# Patient Record
Sex: FEMALE | Race: WHITE | NOT HISPANIC OR LATINO | Employment: STUDENT | URBAN - METROPOLITAN AREA
[De-identification: names, ages, dates, MRNs, and addresses within clinical notes are randomized per-mention and may not be internally consistent; named-entity substitution may affect disease eponyms.]

---

## 2017-08-18 DIAGNOSIS — R00.0 SINUS TACHYCARDIA: Primary | ICD-10-CM

## 2017-08-22 ENCOUNTER — HOSPITAL ENCOUNTER (OUTPATIENT)
Dept: CARDIOLOGY | Facility: CLINIC | Age: 22
Discharge: HOME | End: 2017-08-22
Payer: COMMERCIAL

## 2017-08-22 ENCOUNTER — PATIENT MESSAGE (OUTPATIENT)
Dept: ELECTROPHYSIOLOGY | Facility: CLINIC | Age: 22
End: 2017-08-22

## 2017-08-22 ENCOUNTER — OFFICE VISIT (OUTPATIENT)
Dept: ELECTROPHYSIOLOGY | Facility: CLINIC | Age: 22
End: 2017-08-22
Payer: COMMERCIAL

## 2017-08-22 VITALS
SYSTOLIC BLOOD PRESSURE: 125 MMHG | BODY MASS INDEX: 31.43 KG/M2 | HEART RATE: 88 BPM | HEIGHT: 66 IN | DIASTOLIC BLOOD PRESSURE: 71 MMHG | WEIGHT: 195.56 LBS

## 2017-08-22 DIAGNOSIS — R00.0 SINUS TACHYCARDIA: ICD-10-CM

## 2017-08-22 DIAGNOSIS — F90.2 ATTENTION DEFICIT HYPERACTIVITY DISORDER (ADHD), COMBINED TYPE: ICD-10-CM

## 2017-08-22 PROBLEM — F90.9 ADHD: Status: ACTIVE | Noted: 2017-08-22

## 2017-08-22 PROCEDURE — 99999 PR PBB SHADOW E&M-EST. PATIENT-LVL III: CPT | Mod: PBBFAC,,, | Performed by: INTERNAL MEDICINE

## 2017-08-22 PROCEDURE — 99203 OFFICE O/P NEW LOW 30 MIN: CPT | Mod: S$GLB,,, | Performed by: INTERNAL MEDICINE

## 2017-08-22 PROCEDURE — 93000 ELECTROCARDIOGRAM COMPLETE: CPT | Mod: S$GLB,,, | Performed by: INTERNAL MEDICINE

## 2017-08-22 RX ORDER — LEVOTHYROXINE SODIUM 88 UG/1
TABLET ORAL
COMMUNITY
Start: 2017-08-13

## 2017-08-22 RX ORDER — NORETHINDRONE ACETATE AND ETHINYL ESTRADIOL, AND FERROUS FUMARATE 1.5-30(21)
KIT ORAL
COMMUNITY
Start: 2017-08-11

## 2017-08-22 NOTE — PROGRESS NOTES
Subjective:    Patient ID:  Eliza Ibarra is a 21 y.o. female who presents for evaluation of Tachycardia      21 yoF ADHD here for management of tachycardia. She had sinus tachycardia incidentally documented on ECG as part of routine screening prior to ADHD medication initiation. She wore a holter monitor that showed no arrhythmias, no ectopy Her echo showed normal LV function. She is asymptomatic with her heart rate and is able to exercise regularly. She has no history of syncope, near syncope, chest pain, dyspnea. No family history of syncope, near syncope, SCD.     Jorge Fisher [phone] ADHD            Review of Systems   Constitution: Negative.   HENT: Negative.    Eyes: Negative.    Cardiovascular: Negative for chest pain, dyspnea on exertion, leg swelling, near-syncope, palpitations and syncope.   Respiratory: Negative.  Negative for shortness of breath.    Endocrine: Negative.    Hematologic/Lymphatic: Negative.    Skin: Negative.    Musculoskeletal: Negative.    Gastrointestinal: Negative.    Genitourinary: Negative.    Neurological: Negative.  Negative for dizziness and light-headedness.   Psychiatric/Behavioral: Negative.    Allergic/Immunologic: Negative.         Objective:    Physical Exam   Constitutional: She is oriented to person, place, and time. She appears well-developed and well-nourished. No distress.   HENT:   Head: Normocephalic and atraumatic.   Eyes: EOM are normal. Pupils are equal, round, and reactive to light.   Neck: Normal range of motion. No JVD present. No thyromegaly present.   Cardiovascular: Normal rate, regular rhythm, S1 normal, S2 normal and normal heart sounds.  PMI is not displaced.  Exam reveals no gallop and no friction rub.    No murmur heard.  Pulmonary/Chest: Effort normal and breath sounds normal. No respiratory distress. She has no wheezes. She has no rales.   Abdominal: Soft. Bowel sounds are normal. She exhibits no distension. There is no tenderness.  There is no rebound and no guarding.   Musculoskeletal: Normal range of motion. She exhibits no edema or tenderness.   Neurological: She is alert and oriented to person, place, and time. No cranial nerve deficit.   Skin: Skin is warm and dry. No rash noted. No erythema.   Psychiatric: She has a normal mood and affect. Her behavior is normal. Judgment and thought content normal.   Vitals reviewed.    ECG: NSR nl DC, QRS, QTc        Assessment:       1. Attention deficit hyperactivity disorder (ADHD), combined type         Plan:     21 yoF with asymptomatic sinus tachycardia here for evaluation. She has appropriate heart rates for her age. She is asymptomatic with regard to her heart rate. She has normal EF. She has no contraindication to ADHD medications. Should she develop symptoms, I would recommend alternative therapy.

## 2017-12-05 ENCOUNTER — HOSPITAL ENCOUNTER (OUTPATIENT)
Dept: RADIOLOGY | Facility: HOSPITAL | Age: 22
Discharge: HOME | End: 2017-12-05
Attending: PHYSICAL MEDICINE & REHABILITATION
Payer: COMMERCIAL

## 2017-12-05 ENCOUNTER — OFFICE VISIT (OUTPATIENT)
Dept: PHYSICAL MEDICINE AND REHAB | Facility: CLINIC | Age: 22
End: 2017-12-05
Payer: COMMERCIAL

## 2017-12-05 VITALS
HEIGHT: 66 IN | HEART RATE: 102 BPM | DIASTOLIC BLOOD PRESSURE: 84 MMHG | SYSTOLIC BLOOD PRESSURE: 140 MMHG | BODY MASS INDEX: 31.18 KG/M2 | WEIGHT: 194 LBS

## 2017-12-05 DIAGNOSIS — M54.50 CHRONIC BILATERAL LOW BACK PAIN WITHOUT SCIATICA: ICD-10-CM

## 2017-12-05 DIAGNOSIS — G89.29 CHRONIC BILATERAL LOW BACK PAIN WITHOUT SCIATICA: ICD-10-CM

## 2017-12-05 DIAGNOSIS — G89.29 CHRONIC BILATERAL LOW BACK PAIN WITHOUT SCIATICA: Primary | ICD-10-CM

## 2017-12-05 DIAGNOSIS — M54.50 CHRONIC BILATERAL LOW BACK PAIN WITHOUT SCIATICA: Primary | ICD-10-CM

## 2017-12-05 PROCEDURE — 72100 X-RAY EXAM L-S SPINE 2/3 VWS: CPT | Mod: 26,,, | Performed by: RADIOLOGY

## 2017-12-05 PROCEDURE — 72120 X-RAY BEND ONLY L-S SPINE: CPT | Mod: 26,,, | Performed by: RADIOLOGY

## 2017-12-05 PROCEDURE — 99999 PR PBB SHADOW E&M-EST. PATIENT-LVL III: CPT | Mod: PBBFAC,,, | Performed by: PHYSICAL MEDICINE & REHABILITATION

## 2017-12-05 PROCEDURE — 72120 X-RAY BEND ONLY L-S SPINE: CPT | Mod: TC

## 2017-12-05 PROCEDURE — 99204 OFFICE O/P NEW MOD 45 MIN: CPT | Mod: S$GLB,,, | Performed by: PHYSICAL MEDICINE & REHABILITATION

## 2017-12-05 RX ORDER — BACLOFEN 10 MG/1
10 TABLET ORAL 3 TIMES DAILY PRN
Qty: 20 TABLET | Refills: 0 | Status: SHIPPED | OUTPATIENT
Start: 2017-12-05 | End: 2018-01-04

## 2017-12-05 RX ORDER — MELOXICAM 15 MG/1
15 TABLET ORAL DAILY
Qty: 30 TABLET | Refills: 1 | Status: SHIPPED | OUTPATIENT
Start: 2017-12-05 | End: 2018-01-29 | Stop reason: SDUPTHER

## 2017-12-05 NOTE — PATIENT INSTRUCTIONS
Neck/Back Pain [General]    Both neck and back pain are usually caused by injury to the muscles or ligaments of the spine. Sometimes the disks that separate each bone of the spine may cause pain by putting pressure on a nearby nerve. Back and neck pain may appear after a sudden twisting/bending force (such as in a car accident), or sometimes after a simple awkward movement. In either case, muscle spasm is often present and adds to the pain.  Acute neck and back pain usually gets better in one to two weeks. Pain related to disk disease, arthritis in the spinal joints or spinal stenosis (narrowing of the spinal canal) can become chronic and last for months or years.  Home Care:  · FOR NECK PAIN: Use a comfortable pillow that supports the head and keeps the spine in a neutral position. The position of the head should not be tilted forward or backward.  · FOR BACK PAIN: You may need to stay in bed the first few days. But, as soon as possible, begin sitting or walking to avoid problems with prolonged bed rest (muscle weakness, worsening back stiffness and pain, blood clots in the legs).  · When in bed, try to find a position of comfort. A firm mattress is best. Try lying flat on your back with pillows under your knees. You can also try lying on your side with your knees bent up towards your chest and a pillow between your knees.  · Avoid prolonged sitting. This puts more stress on the lower back than standing or walking.  · During the first two days after injury, apply an ICE PACK to the painful area for 20 minutes every 2-4 hours. This will reduce swelling and pain. HEAT (hot shower, hot bath or heating pad) works well for muscle spasm. You can start with ice, then switch to heat after two days. Some patients feel best alternating ice and heat treatments. Use the one method that feels the best to you.  · You may use acetaminophen (Tylenol) or ibuprofen (Motrin, Advil) to control pain, unless another pain medicine was  prescribed. [NOTE: If you have chronic liver or kidney disease or ever had a stomach ulcer or GI bleeding, talk with your doctor before using these medicines.]  · Be aware of safe lifting methods and do not lift anything over 15 pounds until all the pain is gone.  Follow Up  with your physician or this facility if your symptoms do not start to improve after one week. Physical therapy or further tests may be needed.  [NOTE: If X-rays were taken, they will be reviewed by a radiologist. You will be notified of any new findings that may affect your care.]  Get Prompt Medical Attention  if any of the following occur:  · Pain becomes worse or spreads into your arms or legs  · Weakness, numbness or pain in one or both arms or legs  · Loss of bowel or bladder control  · Numbness in the groin area  · Difficulty walking  · Fever of 100.4ºF (38ºC) or higher, or as directed by your healthcare provider  © 5900-1495 [facility], [address] [place] [address]. All rights reserved. This information is not intended as a substitute for professional medical care. Always follow your healthcare professional's instructions.    Back Exercises: Back Press  Do this exercise on your hands and knees. Keep your knees under your hips and your hands under your shoulders. Keep your spine in a neutral position (not arched or sagging). Be sure to maintain your necks natural curve.  · Tighten your abdominal and buttocks muscles to press your back upward. Let your head drop slightly.  · Hold for 5 seconds. Return to starting position.  · Repeat 5 times.     © 4546-0971 [facility], [address] [place] [address]. All rights reserved. This information is not intended as a substitute for professional medical care. Always follow your healthcare professional's instructions.    Back Exercises: Back Release  Do this exercise on your hands and knees. Keep your knees under your hips and your hands under your shoulders. Keep  your spine in a neutral position (not arched or sagging). Be sure to maintain your necks natural curve.  · Relax your abdominal and buttocks muscles, lift your head, and let your back sag. Be sure to keep your weight evenly distributed. Dont sit back on your hips.   · Hold for 5 seconds.  · Return to starting position.  · Repeat 5 times.  © 9507-6009 [facility] [place] [address]. All rights reserved. This information is not intended as a substitute for professional medical care. Always follow your healthcare professional's instructions.    Back Exercises: Bridge  The Bridge exercise strengthens your abdominal, buttocks, and hamstring muscles. This helps keep your back stable and aligned when you walk.  · Lie on the floor with your back and palms flat. Bend your knees. Keep your feet flat on the floor.  · Contract your abdominal and buttocks muscles. Slowly lift your buttocks off the floor until there is a straight line from your knees to your shoulders.  · Hold for 5  seconds. Repeat 10 times.    © 9575-6824 [place] [address]. All rights reserved. This information is not intended as a substitute for professional medical care. Always follow your healthcare professional's instructions.    Back Exercises: Elbow Press    To start, lie face down on your stomach, feet slightly apart, forehead on the floor. Breathe deeply. You should feel comfortable and relaxed in this position.  · Press up on your forearms. Keep your abdomen and hips on the floor.  · Hold for 20 seconds. Lower slowly.  · Repeat 2 times.  · Return to starting position.  © 4945-3395 [facility], [address] [place] [address]. All rights reserved. This information is not intended as a substitute for professional medical care. Always follow your healthcare professional's instructions.    Back Exercises: Pelvic Tilt  To start, lie on your back with your knees bent and  feet flat on the floor. Dont press your neck or lower back to the floor. Breathe deeply. You should feel comfortable and relaxed in this position.  · Tighten your abdomen and buttocks, and press your lower back toward the floor. This should be a small, subtle movement.  · Hold for 5 seconds. Release.  · Repeat 5 times.         © 2386-4774 [facility], [address] [place] [address]. All rights reserved. This information is not intended as a substitute for professional medical care. Always follow your healthcare professional's instructions.    Back Exercises: Partial Curl-Ups          To start, lie on your back with your knees bent and feet flat on the floor. Dont press your neck or lower back to the floor. Breathe deeply. You should feel comfortable and relaxed in this position.  · Cross your arms loosely.  · Tighten your abdomen and curl longterm up, keeping your head in line with your shoulders.  · Hold for 5 seconds. Uncurl to lie down.  · Repeat 5 times.   © 2894-7202 [facility]", [address] [place] [address]. All rights reserved. This information is not intended as a substitute for professional medical care. Always follow your healthcare professional's instructions.    Back Exercises: Lower Back Stretch                            To start, sit in a chair with your feet flat on the floor. Shift your weight slightly forward to avoid rounding your back. Relax, and keep your ears, shoulders, and hips aligned.  · Sit with your feet well apart.  · Bend forward and touch the floor with the backs of your hands. Relax and let your body drop.  · Hold for 20 seconds. Return to starting position.  · Repeat 2 times.   © 8765-7743 [facility]", [address] [place] [address]. All rights reserved. This information is not intended as a substitute for professional medical care. Always follow your healthcare professional's instructions.    Back Exercises: Seated Rotation      To  start, sit in a chair with your feet flat on the floor. Shift your weight slightly forward to avoid rounding your back. Relax, and keep your ears, shoulders, and hips aligned.  · Fold your arms, elbows just below shoulder height.  · Turn from the waist with hips forward. Turn your head last.  · Hold for a count of 5. Return to starting position.  · Repeat 5 times on one side. Then switch sides.  © 2755-5277 [facility], [address] [place] [address]. All rights reserved. This information is not intended as a substitute for professional medical care. Always follow your healthcare professional's instructions.    Back Exercises: Side Stretch  To start, sit in a chair with your feet flat on the floor. Shift your weight slightly forward to avoid rounding your back. Relax. Keep your ears, shoulders, and hips aligned.  · Stretch your right arm overhead.  · Slowly bend to the left. Dont twist your torso.  · Hold for 20 seconds. Return to starting position.  · Repeat 2 times. Then, switch to the other side.  © 2036-6610 [facility], [address] [place] [address]. All rights reserved. This information is not intended as a substitute for professional medical care. Always follow your healthcare professional's instructions

## 2017-12-05 NOTE — PROGRESS NOTES
Subjective:       Patient ID: Eliza Ibarra is a 22 y.o. female.    Chief Complaint: No chief complaint on file.    HPI     HISTORY OF PRESENT ILLNESS:  Ms. Ibarra is a 22-year-old white female with   past medical history of ADHD, hypothyroidism and multiple knee procedures who is   presenting to the [facility] for chronic low back pain with   recent exacerbation.    The patient reports that she has had bilateral knee pain, likely secondary to   patellofemoral problems since her teenage years.  She underwent multiple   arthroscopic and open surgery for her knees.  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  She   underwent a few blood patches, but subsequently required laminectomy to repair   the spinal leak.  She reports having multiple x-rays and MRIs that showed spinal   stenosis.  She was subsequently treated through surgical therapy.  Her back   pain has been waxing and waning since.  About two weeks ago, while bending to   put her clothes on, she had acute onset of pain in her lower back.  She denied   any radiation to her legs.  She treated it with over-the-counter   anti-inflammatory medications, muscle relaxants that she had from previous   prescription, ice and heat.  She has improvement, but this last Friday   (12/01/2017), her pain got worse again.  She presented to the Physical Medicine   Clinic for help with management.    Her pain is in the low lumbar spine and across her back.  It is a constant dull   aching pain with occasional sharp sensations.  The pain shoots to both hips, but   she denies any radiation distally to her legs.  Her pain is aggravated mostly   by sitting or lying down.  It is better with moving around and with exercise.    Her maximum pain is 5-6/10 and minimum 3/10.  Today, it is 4/10.  The patient   denies any lower extremity weakness.  She denies any leg numbness.  She denies   any  saddle anesthesia.  She denies any bowel or bladder incontinence.  She   complains of occasional muscle spasms associated with her back pain.  She works   at [employer].  Her job does not involve any heavy lifting, pushing   or pulling.    She is currently taking over-the-counter ibuprofen 200 mg, usually two tablets   three or four times per day with mild relief.  She takes cyclobenzaprine 10 mg   at bedtime to help with sleep.  She reports some sedation with daytime use.  She   tried Tylenol at 500 mg, two tablets, 2-3 times per day, but with no   significant relief.  She prefers not to be on any controlled substances.  She is   planning to go to [place] to visit her family on 12/02/2017.  She is   staying about a month there.      MS/HN  dd: 12/05/2017 11:23:38 (CST)  td: 12/06/2017 10:28:58 (CST)  Doc ID   #[id number]  Job ID #[id number]    CC:            Review of Systems   Constitutional: Negative for chills, fatigue and fever.   Eyes: Negative for visual disturbance.   Respiratory: Negative for chest tightness.    Cardiovascular: Negative for chest pain.   Gastrointestinal: Negative for abdominal pain, blood in stool, constipation, nausea and vomiting.   Genitourinary: Negative for dysuria and hematuria.   Musculoskeletal: Positive for back pain and neck pain. Negative for arthralgias, gait problem and joint swelling.   Neurological: Negative for dizziness and headaches.   Psychiatric/Behavioral: Positive for sleep disturbance. Negative for behavioral problems.       Objective:      Physical Exam   Constitutional: She is oriented to person, place, and time. She appears well-developed and well-nourished.   HENT:   Head: Normocephalic and atraumatic.   Neck: Normal range of motion.   -ve tendnerness.   Cardiovascular: Normal rate, regular rhythm and normal heart sounds.    Pulmonary/Chest: Effort normal and breath sounds normal.   Abdominal: Soft.   Musculoskeletal:   BUE:  ROM:   RUE: full.   LUE:  full.  Strength:    RUE: 5/5 at shoulder abduction, 5 elbow flexion, 5 elbow extension, 5 hand .   LUE: 5/5 at shoulder abduction, 5 elbow flexion, 5 elbow extension, 5 hand .  Sensation to pinprick:   RUE: intact.   LUE: intact.  DTR:    RUE: +2 biceps, +2 triceps.   LUE:  +2 biceps, +2 triceps.    BLE:  ROM:   RLE: full.   LLE: full.  Knee crepitus:   RLE: +ve.   LLE: +ve.   Strength:    RLE: 5/5 at hip flexion, 5 knee extension, 5 ankle DF, 5 PF, 5 EHL.   LLE: 5/5 at hip flexion, 5 knee extension, 5 ankle DF, 5 PF, 5 EHL.  Sensation to pinprick:     RLE: intact.      LLE: intact.   DTR:     RLE: +2 knee, +2 ankle.    LLE: +2 knee, +2 ankle.  Clonus:    Rt ankle: -ve.    Lt ankle: -ve.  SLR:      RLE: -ve at 80 degrees.      LLE: -ve at 80 degrees.   KIMBER:     RLE: -ve.      LLE: -ve.  Gilet's:     RLE: -ve.      LLE: -ve.  SI tenderness:     Rt: -ve.      Lt: mild.  Mild tenderness over mid lumbar spine.  No leg length discrepancy.    Directional Preference:  Spine flexion: 60 degrees , mod pain in back.  Spine extension: 40 degrees, no pain in back.  Lateral bending: mild pain to Right, mild pain to Left.      Heel walking: WNL.  Toe walking: WNL.  Gait: WNL     Neurological: She is alert and oriented to person, place, and time.   Skin: Skin is warm.   Psychiatric: She has a normal mood and affect. Her behavior is normal.   Vitals reviewed.      Assessment:       1. Chronic bilateral low back pain without sciatica        Plan:       - X-Ray Lumbar Spine Ap Lateral w/Flex Ext; Future  - Start meloxicam (MOBIC) 15 MG tablet; Take 1 tablet (15 mg total) by mouth once daily.  - Start baclofen (LIORESAL) 10 MG tablet; Take 1 tablet (10 mg total) by mouth 3 (three) times daily as needed (muscle spasms).  - The patient is not able to commit to a course of Physical Therapy at this point (due to leaving to [place] on 12/20/17 for about a month).  - The patient was encouraged to adopt a regular Home Exercise  Program, and provided with printouts.  - Return in about 2 months (around 2/5/2018).      This was a 45 minute visit, 50% of which was spent educating the patient about the diagnosis and the treatment plan, including medication adjustment and getting an MRI if symptoms get worse.

## 2017-12-06 ENCOUNTER — PATIENT MESSAGE (OUTPATIENT)
Dept: PHYSICAL MEDICINE AND REHAB | Facility: CLINIC | Age: 22
End: 2017-12-06

## 2018-01-08 ENCOUNTER — PATIENT MESSAGE (OUTPATIENT)
Dept: PHYSICAL MEDICINE AND REHAB | Facility: CLINIC | Age: 23
End: 2018-01-08

## 2018-01-08 DIAGNOSIS — G89.29 CHRONIC BILATERAL LOW BACK PAIN WITHOUT SCIATICA: Primary | ICD-10-CM

## 2018-01-08 DIAGNOSIS — M54.50 CHRONIC BILATERAL LOW BACK PAIN WITHOUT SCIATICA: Primary | ICD-10-CM

## 2018-01-19 ENCOUNTER — CLINICAL SUPPORT (OUTPATIENT)
Dept: REHABILITATION | Facility: HOSPITAL | Age: 23
End: 2018-01-19
Attending: PHYSICAL MEDICINE & REHABILITATION
Payer: COMMERCIAL

## 2018-01-19 DIAGNOSIS — G89.29 CHRONIC LEFT-SIDED LOW BACK PAIN WITHOUT SCIATICA: ICD-10-CM

## 2018-01-19 DIAGNOSIS — M54.50 CHRONIC LEFT-SIDED LOW BACK PAIN WITHOUT SCIATICA: ICD-10-CM

## 2018-01-19 PROCEDURE — 97161 PT EVAL LOW COMPLEX 20 MIN: CPT | Mod: PO | Performed by: PHYSICAL THERAPIST

## 2018-01-19 NOTE — PATIENT INSTRUCTIONS
[employer] Home Exercise Program  Created by billie yoon Jan 16th, 2018      Total 6      [place] Exercise 1     1. Lie on the bed/floor with knees bent and arms at your side. 2. Hold your abdominals in by doing your basic breath contraction keeping your pelvis in neutral. 3. Keeping one knee bent, slowly slide the opposite leg out until its straight, then slide it back in. 4. Relax your abdominals and repeat with other leg    WHEN YOU CAN COMFORTABLY DO 20 LEG SLIDES ON EACH SIDE MOVE TO EXERCISE 2.  Repeat 15 Times     Complete 1 Set     Perform 2 Time(s) a Day                 Hip Flexion Isometric Seated    using the back of chair as support, raise one leg. Placing the same side hand on the raised knee, apply pressure with hand. The raised leg will resist the pressure for assigned seconds.    Postural awareness; be sure not to lean the trunk to the side. Maintain postural neutrality at all times.  Repeat 10 Times     Hold 10 Seconds     Complete 1 Set     Perform 2 Time(s) a Day           Inhalation to increase rib expansion standing with back to wall  3-4x/day 15 reps

## 2018-01-19 NOTE — PLAN OF CARE
Physical Therapy Initial Evaluation     Name: Eliza Ibarra  Clinic Number: [id number]    Diagnosis:   Encounter Diagnosis   Name Primary?    Chronic left-sided low back pain without sciatica      Physician: Cinda Hansen MD  Treatment Orders: PT Eval and Treat  Past Medical History:   Diagnosis Date    ADHD 06/2017    Thyroid disease     hypothyroidism     Current Outpatient Prescriptions   Medication Sig    baclofen (LIORESAL) 10 MG tablet Take 1 tablet (10 mg total) by mouth 3 (three) times daily as needed (muscle spasms).    JUNEL FE 1.5/30, 28, 1.5 mg-30 mcg (21)/75 mg (7) tablet     levothyroxine (SYNTHROID) 88 MCG tablet      No current facility-administered medications for this visit.      Review of patient's allergies indicates:  No Known Allergies    Evaluation Date: 1/19/18  Visit # authorized: 20  Authorization period: 1/18/18  Plan of care Expiration: 12/31/18    Subjective     Patient states: in November 2017, pt was putting clothes away. Pain shot into L hip and back. Went to bed, couldn't get comfortable. Very sore feeling. A few days later, jolting pain again, sharp pain while brushing her teeth. Prior lumbar surgery. Sitting is very painful. At home over break, pt was unable to get comfotable, sharp pain. Pt does elliptical currently as this is not very painful. 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   Pain Scale: Eliza rates pain on a scale of 0-10 to be 7 at worst; 3 currently; 3 at best .  Onset: sudden  Radicular symptoms:  L LE radicular sxs down lateral leg to below knee  Aggravating factors:   Bending,lifting  Easing factors:  Laying down  Prior Therapy: yes, due to multiple knee surgeries, spinal anesthesia with resultant spinal leak requiring laminectomy L3-L5.  Prior functional status: running/elliptical 5 days/week.                  Pts goals:  Return to normal activites, sit without  "pain.    Precautions: no spinal manipulations    Objective     Posture Alignment: increased lumbar lordosis, pronation B    LUMBAR SPINE AROM:   Flexion: Wfl, spine held in extension   Extension: NT, resting position   Left Sidebend: 50% restricted with loss of c curve, shearing effect in LS @ L3- L4, painful   Right Sidebend: 50% restricted with loss of c curve, shearing effect in LS L3-L4, painful   Left Rotation: wfl   Right Rotation: wfl     SEGMENTAL MOBILITY: Hypermobilty in Lumbar spine   LOWER EXTREMITY STRENGTH:   Left Right   Quadriceps 4-/5 4-/5   Hamstrings 4-/5 4-/5     Iliopsoas 3/5, with trunk rotation to same side 3+/5, with trunk rotation to same side   PGM 3/5 3+/5   Hip IR 3+/5 4-/5   Hip ER 3+/5 4-/5   Hip Ext 3+/5 4-/5     Hip flexor dominance with attempted SL mm testing of abductors L/R    Lower abdominal MM performance 3-/5      Dermatomes: Sensation: Light Touch: Intact    Flexibility:Hip musculature more stiff than LS    Special Tests: infrasternal angle 90 degrees, rib expansion with inhalation 1" with overuse of accessory breathing mms.   Left Right   Slump - -   SLR + +   BKFO + +       GAIT: Eliza ambulates with no assistive device with independently.     GAIT DEVIATIONS: Eliza displays pronation B, excessive lumbar motion with ambulation.     Pt is unable to sustain Sidelying or quadruped position due to pain.    Pt/family was provided educational information, including: role of PT, goals for PT, scheduling - pt verbalized understanding. Discussed insurance limitations with pt.     TREATMENT     Time In: 10:00  Time Out: 11:00    PT Evaluation Completed? Yes  Discussed Plan of Care with patient: Yes    Eliza received 25 minutes of therapeutic exercise & instruction including: allleviation of pain with manual lifting of trunk in sitting to 1/10 pain. Decreased pain with sidebending by blocking LS motion L/R.   Positioning discussed regarding sitting with back supported, using " "armrests, limit sleeping on stomach, avoid painful positions/activites until sxs are more manageable.       Written Home Exercises Provided: yes  Eliza demo good understanding of the education provided. Patient demo good return demo of skill of exercises.    CMS Impairment/Limitation/Restriction for FOTO Lumbar Spine Survey  Status Limitation G-Code CMS Severity Modifier  Intake 60% 40% Current Status CK - At least 40 percent but less than 60 percent  Predicted 77% 23% Goal Status+ CJ - At least 20 percent but less than 40 percent        Assessment     Patient tolerated evaluation well, pt presents with extension/rotation syndrome with dominance of R LE musculature influencing pelvic motion, decreased rib expansion with inhalation   Pt prognosis is Excellent.  Pt will benefit from skilled outpatient physical therapy to address the above stated deficits, provide pt/family education and to maximize pt's level of independence.     Medical necessity is demonstrated by the following IMPAIRMENTS/PROBLEMS:  1. Increased Pain  2. Decreased Segmental Mobility & Decreased ROM  3. Decreased Core & BLE strength  4. Decreased Flexibility BLE  5. Decreased Tolerance to Functional Activities    Pt's spiritual, cultural and educational needs considered and pt agreeable to plan of care and goals as stated below:     Anticipated Barriers for physical therapy: none    Short Term GOALS: 3 weeks. Pt agrees with goals set.  1. Patient demonstrates independence with HEP.   2. Patient demonstrates independence with Postural Awareness.   3. Patient demonstrates independence with body mechanics.   4. Improve rib expansion by 1" to decrease utilization of accessory muscles     Long Term GOALS: 6 weeks. Pt agrees with goals set.  1. Patient demonstrates increased  Rib expansion to 2.5", improved core control with lower abdominals 3/5 to improve tolerance to functional activities pain free.   2. Patient demonstrates increased strength L LE to " 4-/5 or greater to improve tolerance to functional activities pain free.   3. Patient demonstrates improved overall function per FOTO Lumbar Survey to 39% Limitation or less.   4. Painfree return to sports/sitting/ADL.    PLAN     Outpatient physical therapy 2 times weekly to include: pt ed, hep, therapeutic exercises, neuromuscular re-education/ balance exercises, and modalities prn. Cont PT for  6 weeks. Pt may be seen by PTA as part of the rehabilitation team.     Therapist: Vanesa Little, PT  1/19/2018      I agree with above assessment and plan.    Cinda Hansen MD    Physician/Referring Practitioner     02/03/2016  Date of Signature

## 2018-01-29 DIAGNOSIS — M54.50 CHRONIC BILATERAL LOW BACK PAIN WITHOUT SCIATICA: ICD-10-CM

## 2018-01-29 DIAGNOSIS — G89.29 CHRONIC BILATERAL LOW BACK PAIN WITHOUT SCIATICA: ICD-10-CM

## 2018-01-29 RX ORDER — MELOXICAM 15 MG/1
15 TABLET ORAL DAILY
Qty: 30 TABLET | Refills: 1 | OUTPATIENT
Start: 2018-01-29 | End: 2018-02-28

## 2018-01-29 NOTE — TELEPHONE ENCOUNTER
12/05/17 last Rx refill  12/05/17 last office visit        Rachel COYLE Staff   Caller: Ward    [facility]     [phone] (Today,  1:01 PM)             Calling to request a prescription for a 90 day supply of meloxicam (MOBIC) 15 MG tablet.

## 2018-02-05 ENCOUNTER — CLINICAL SUPPORT (OUTPATIENT)
Dept: REHABILITATION | Facility: HOSPITAL | Age: 23
End: 2018-02-05
Attending: PHYSICAL MEDICINE & REHABILITATION
Payer: COMMERCIAL

## 2018-02-05 DIAGNOSIS — G89.29 CHRONIC LEFT-SIDED LOW BACK PAIN WITHOUT SCIATICA: ICD-10-CM

## 2018-02-05 DIAGNOSIS — M54.50 CHRONIC LEFT-SIDED LOW BACK PAIN WITHOUT SCIATICA: ICD-10-CM

## 2018-02-05 PROCEDURE — 97110 THERAPEUTIC EXERCISES: CPT | Mod: PO | Performed by: PHYSICAL THERAPIST

## 2018-02-05 NOTE — PATIENT INSTRUCTIONS
"[email or web address] Home Exercise Program  Created by billie yoon Jan 16th, 2018      Total 6      Sahrmann Exercise 2    1. Lie on floor with knees bent and arms at your side. 2. Pull in your abdominals, keeping neutral pelvis, and hold. 3. Raise one knee towards your chest and slowly straighten it out parallel to the floor. 4. Return extended leg to starting position and repeat with other leg. 5. Work up to 5 reps on each side without stopping    ONCE ABLE TO PERFORM 20 OR MORE ON EACH SIDE THEN MOVE TO EXERCISE 3  Repeat 15 Times     Complete 1 Set     Perform 2 Time(s) a Day            Home Exercise Program  Created by billie yoon Feb 5th, 2018      Total 3      ELASTIC BAND LATERAL WALKS - PROXIMAL    With an elastic band around your thighs, take steps to the side while keeping your feet spread apart. Keep your knees bent the entire time.  Repeat 15 Times     Hold 1 Second     Complete 1 Set     Perform 2 Time(s) a Day     Watch Video      Enlarge       Midstance: Hip Burner    Place stability ball between outside of knee and wall, bend the knee that is touching the ball to mimic single leg stance during running with most weight shifted on stance leg. Press outside of the knee into the ball using only the hip muscles, hold for 3 seconds, relax, repeat x 10, both sides. (If you're doing it correctly, you'll see why I call it the hip "burner")    Other things to consider: 1. Use a mirror to look for compensations (most common are leaning your whole body into the wall instead of using hip muscles AND leaning shoulders to one side over the hip) 2. Work on alignment of knees, feet, and Arms. 3. Practice barefoot, watch stance foot supinate with each push into the ball (if done correctly)  Repeat 10 Times     Hold 10 Seconds     Complete 2 Sets     Perform 2 Time(s) a Day     Enlarge       NICKI Ball Rollout     1. Kneel on your knees with a large Physio Ball in front of you. 2. Place your elbows on the ball and slowly " create a hinge in your spine by holding your abdominal muscles tight and rounding out your back (opposite of arching your back). 3. While maintaining the hinge slowly place as much weight as possible on your elbows and roll the ball out forwards. Roll the ball only to the point where you can maintain the hinge. When you lose the hinge you have rolled too far. 4. While keeping your head in a neutral position take four deep breaths in through your nose and out through your mouth. 5. Roll the ball back to the start position and relax. Repeat three more time.  Repeat 10 Times     Hold 0 Seconds     Complete 2 Sets     Perform 2 Time(s) a Day     Enlarge        Copyright [email or web address]

## 2018-02-05 NOTE — PROGRESS NOTES
"                                                    Physical Therapy Daily Note     Name: Eliza Zunigagensen  Clinic Number: [id number]  Diagnosis:   Encounter Diagnosis   Name Primary?    Chronic left-sided low back pain without sciatica      Physician: Cinda Hansen MD  Precautions: prior lumbar sx  Visit #: 2 of 12  PTA Visit #: 0  Time In: 3:00  Time Out: 4:00  Total Treatment Time 1:1: 60 min    Evaluation Date: 1/19/18  Visit # authorized: 20  Authorization period: 1/18/18  Plan of care Expiration: 12/31/18    Subjective     Pt reports: she is doing much better, less back pain and tolerating sitting much better. "I want to get better and I will be doing a lot of standing at [facility] so that will tell me if I am better or not.   Pain Scale: Eliza rates pain on a scale of 0-10 to be 1 currently.    Objective     Eliza received individual therapeutic exercises to develop core stabilization for 60 minutes including:  Sahrmann L2 abdominal progression ex, U stance L/R to decrease femoral IR/balance, side stepping with blue t band for resistance 4 x 25', quadruped rock backs with inhalation at end range x 10 without pain this visit. Sidelying hip abduction against wall x 10 L/R, ambulate with slight push off to decrease knee hyperextension, Prone heel to buttock with core engaged to limit movement of trunk.     Rib excursion to 3" with inhalation    Written Home Exercises Provided: HEP upgraded, will issue next visit  Pt demo good understanding of the education provided. Eliza demonstrated good return demonstration of activities.     Education provided re: femoral IR in standing  Eliza verbalized good understanding of education provided.   No spiritual or educational barriers to learning provided    Assessment     Patient tolerated treatment well, progression to L2 abdominal exs, decreased pain today and improved tolerance to exercise positions without sxs   This is a 22 y.o. female referred " "to outpatient physical therapy and presents with a medical diagnosis of Back pain and demonstrates limitations as described in the problem list. Pt prognosis is Excellent. Pt will continue to benefit from skilled outpatient physical therapy to address the deficits listed in the problem list, provide pt/family education and to maximize pt's level of independence in the home and community environment.     Goals as follows:Short Term GOALS: 3 weeks. Pt agrees with goals set.  1. Patient demonstrates independence with HEP.   2. Patient demonstrates independence with Postural Awareness.   3. Patient demonstrates independence with body mechanics.   4. Improve rib expansion by 1" to decrease utilization of accessory muscles      Long Term GOALS: 6 weeks. Pt agrees with goals set.  1. Patient demonstrates increased  Rib expansion to 2.5", improved core control with lower abdominals 3/5 to improve tolerance to functional activities pain free.   2. Patient demonstrates increased strength L LE to 4-/5 or greater to improve tolerance to functional activities pain free.   3. Patient demonstrates improved overall function per FOTO Lumbar Survey to 39% Limitation or less.   4. Painfree return to sports/sitting/ADL.       Plan     Continue with established Plan of Care towards PT goals.    Vanesa Little, PT  2/5/2018        "

## 2018-02-07 ENCOUNTER — CLINICAL SUPPORT (OUTPATIENT)
Dept: REHABILITATION | Facility: HOSPITAL | Age: 23
End: 2018-02-07
Attending: PHYSICAL MEDICINE & REHABILITATION
Payer: COMMERCIAL

## 2018-02-07 DIAGNOSIS — M54.50 CHRONIC LEFT-SIDED LOW BACK PAIN WITHOUT SCIATICA: ICD-10-CM

## 2018-02-07 DIAGNOSIS — G89.29 CHRONIC LEFT-SIDED LOW BACK PAIN WITHOUT SCIATICA: ICD-10-CM

## 2018-02-07 PROCEDURE — 97110 THERAPEUTIC EXERCISES: CPT | Mod: PO | Performed by: PHYSICAL THERAPIST

## 2018-02-09 NOTE — PROGRESS NOTES
"                                                    Physical Therapy Daily Note     Name: Eliza Ibarra  Clinic Number: [id number]  Diagnosis:   Encounter Diagnosis   Name Primary?    Chronic left-sided low back pain without sciatica      Physician: Cinda Hansen MD  Precautions: prior lumbar sx  Visit #: 3 of 12  PTA Visit #: 0  Time In: 4:10  Time Out: 5:00  Total Treatment Time 1:1: 60 min    Evaluation Date: 1/19/18  Visit # authorized: 20  Authorization period: 1/18/18  Plan of care Expiration: 12/31/18    Subjective     Pt reports: she is feeling pretty well, no back pain today, reports compliant with HEP.   Pain Scale: Eliza rates pain on a scale of 0-10 to be 0 currently.    Objective     Eliza received individual therapeutic exercises to develop core stabilization for 50 minutes including:  Sahrmann L2 abdominal progression ex, standing wall squats with blue med ball catch 2 x 10 reps, U ball toss in kneeling L/R for proximal stability ex x 10 reps, pt had difficulty with balance on L side, U RDL with 3 lbs UEs on cable column for balance 2 x 10 reps, rolling ex leading with UEs f/b LEs, Chops in 1/2 kneeling L/R 7 lbs on cable column on foam.    Rib excursion to 3" with inhalation    Written Home Exercises Provided: HEP upgraded  Pt demo good understanding of the education provided. Eliza demonstrated good return demonstration of activities.     Education provided re: femoral IR in standing  Eliza verbalized good understanding of education provided.   No spiritual or educational barriers to learning provided    Assessment     Patient tolerated treatment well, progression to L2 abdominal exs, decreased pain today and improved tolerance to exercise positions without sxs   This is a 22 y.o. female referred to outpatient physical therapy and presents with a medical diagnosis of Back pain and demonstrates limitations as described in the problem list. Pt prognosis is Excellent. Pt " "will continue to benefit from skilled outpatient physical therapy to address the deficits listed in the problem list, provide pt/family education and to maximize pt's level of independence in the home and community environment.     Goals as follows:Short Term GOALS: 3 weeks. Pt agrees with goals set.  1. Patient demonstrates independence with HEP.   2. Patient demonstrates independence with Postural Awareness.   3. Patient demonstrates independence with body mechanics.   4. Improve rib expansion by 1" to decrease utilization of accessory muscles      Long Term GOALS: 6 weeks. Pt agrees with goals set.  1. Patient demonstrates increased  Rib expansion to 2.5", improved core control with lower abdominals 3/5 to improve tolerance to functional activities pain free.   2. Patient demonstrates increased strength L LE to 4-/5 or greater to improve tolerance to functional activities pain free.   3. Patient demonstrates improved overall function per FOTO Lumbar Survey to 39% Limitation or less.   4. Painfree return to sports/sitting/ADL.       Plan     Continue with established Plan of Care towards PT goals.    Vanesa Little, PT  2/7/2018      "

## 2018-02-16 ENCOUNTER — CLINICAL SUPPORT (OUTPATIENT)
Dept: REHABILITATION | Facility: HOSPITAL | Age: 23
End: 2018-02-16
Attending: PHYSICAL MEDICINE & REHABILITATION
Payer: COMMERCIAL

## 2018-02-16 DIAGNOSIS — G89.29 CHRONIC LEFT-SIDED LOW BACK PAIN WITHOUT SCIATICA: ICD-10-CM

## 2018-02-16 DIAGNOSIS — M54.50 CHRONIC LEFT-SIDED LOW BACK PAIN WITHOUT SCIATICA: ICD-10-CM

## 2018-02-16 PROCEDURE — 97110 THERAPEUTIC EXERCISES: CPT | Mod: PO | Performed by: PHYSICAL THERAPIST

## 2018-02-16 NOTE — PATIENT INSTRUCTIONS
[facility] Home Exercise Program  Created by billie yoon Jan 16th, 2018      Total 6      Sahrmann Exercise 3    1. Lie on your back with knees bent and arms at your side. 2. Pull in your abdominals, keeping neutral pelvis and hold. 3. Bring your legs up one at a time towards your body with knees bent.     ONCE ABLE TO PERFORM 20 REPS ON EACH LEG MOVE TO EXERCISE 4.  Repeat 15 Times     Complete 1 Set     Perform 2 Time(s) a Day           Copyright [email or web address]

## 2018-02-16 NOTE — PROGRESS NOTES
"                                                    Physical Therapy Daily Note     Name: Eliza Ibarra  Clinic Number: [id number]  Diagnosis:   No diagnosis found.  Physician: Cinda Hansen MD  Precautions: prior lumbar sx  Visit #: 4 of 12  PTA Visit #: 0  Time In: 3:00  Time Out: 3:28  Total Treatment Time 1:1: 28 min    Evaluation Date: 1/19/18  Visit # authorized: 20  Authorization period: 1/18/18  Plan of care Expiration: 2/28/18    Subjective     Pt reports: she is feeling so much better, no pain and consistently not having any pain with activity.    Pain Scale: Eliza rates pain on a scale of 0-10 to be 0 currently.    Objective     Eliza received individual therapeutic exercises to develop core stabilization for 28 minutes including:  Sahrmann L2 abdominal progression ex, standing wall squats with blue med ball catch 2 x 10 reps, U RDL with 3 lbs UEs on cable column for balance 2 x 10 reps, rolling ex leading with UEs f/b LEs, sled push 4x50'. Pt able to progress to sahrmann L3 abdominal ex. Pt issued copy    MMT: B LEs 4/5    Lumbar ROM: WNL throughout, pt still exhibits excessive lumbar SB to the L. Offered pt corrective movement to block at rib level to perform L sb which resolved excessive LS ROM issue with SB.   Rib excursion to 3" with inhalation    Written Home Exercises Provided: HEP upgraded  Pt demo good understanding of the education provided. Eliza demonstrated good return demonstration of activities.     Education provided re: importance of continuing HEP at home and to limit lumbar SB to L mango when reaching down from desk height to the L side.   Eliza verbalized good understanding of education provided.   No spiritual or educational barriers to learning provided    Abdominal MMT: 3/5     Assessment     Patient tolerated treatment well, progression to L3 abdominal exs, decreased pain today and improved tolerance to exercise positions without sxs   This is a 22 y.o. " "female referred to outpatient physical therapy and presents with a medical diagnosis of Back pain and demonstrates limitations as described in the problem list. Pt has met all goals estblished, reached max benefit in physical therapy and will continue to work out in gym setting.     Goals as follows:Short Term GOALS: 3 weeks. Pt agrees with goals set.  1. Patient demonstrates independence with HEP.MET  2. Patient demonstrates independence with Postural Awareness. MET  3. Patient demonstrates independence with body mechanics. MET  4. Improve rib expansion by 1" to decrease utilization of accessory muscles MET     Long Term GOALS: 6 weeks. Pt agrees with goals set.  1. Patient demonstrates increased  Rib expansion to 2.5", improved core control with lower abdominals 3/5 to improve tolerance to functional activities pain free. MET  2. Patient demonstrates increased strength L LE to 4-/5 or greater to improve tolerance to functional activities pain free. MET  3. Patient demonstrates improved overall function per FOTO Lumbar Survey to 39% Limitation or less. NT  4. Painfree return to sports/sitting/ADL. MET       Plan     Pt discharged from physical therapy.    Vanesa Little, PT  2/16/2018    "

## 2018-08-15 ENCOUNTER — OFFICE VISIT (OUTPATIENT)
Dept: INTERNAL MEDICINE | Facility: CLINIC | Age: 23
End: 2018-08-15
Payer: COMMERCIAL

## 2018-08-15 VITALS
OXYGEN SATURATION: 99 % | BODY MASS INDEX: 28.5 KG/M2 | SYSTOLIC BLOOD PRESSURE: 110 MMHG | HEIGHT: 65 IN | DIASTOLIC BLOOD PRESSURE: 78 MMHG | HEART RATE: 80 BPM | WEIGHT: 171.06 LBS

## 2018-08-15 DIAGNOSIS — Z79.3 ON ORAL CONTRACEPTIVE PILLS FOR NON-CONTRACEPTION INDICATION: ICD-10-CM

## 2018-08-15 DIAGNOSIS — G44.229 CHRONIC TENSION-TYPE HEADACHE, NOT INTRACTABLE: ICD-10-CM

## 2018-08-15 DIAGNOSIS — Z00.00 ANNUAL PHYSICAL EXAM: Primary | ICD-10-CM

## 2018-08-15 DIAGNOSIS — E53.8 B12 DEFICIENCY: ICD-10-CM

## 2018-08-15 DIAGNOSIS — R14.0 ABDOMINAL BLOATING: ICD-10-CM

## 2018-08-15 DIAGNOSIS — E03.9 PRIMARY HYPOTHYROIDISM: ICD-10-CM

## 2018-08-15 DIAGNOSIS — R53.83 FATIGUE, UNSPECIFIED TYPE: ICD-10-CM

## 2018-08-15 DIAGNOSIS — Z98.890 HISTORY OF LUMBOSACRAL SPINE SURGERY: ICD-10-CM

## 2018-08-15 DIAGNOSIS — Z76.89 ESTABLISHING CARE WITH NEW DOCTOR, ENCOUNTER FOR: ICD-10-CM

## 2018-08-15 DIAGNOSIS — Z98.890 STATUS POST LEFT KNEE SURGERY: ICD-10-CM

## 2018-08-15 DIAGNOSIS — F90.2 ATTENTION DEFICIT HYPERACTIVITY DISORDER (ADHD), COMBINED TYPE: ICD-10-CM

## 2018-08-15 PROBLEM — M54.50 CHRONIC LOW BACK PAIN WITHOUT SCIATICA: Status: RESOLVED | Noted: 2018-01-19 | Resolved: 2018-08-15

## 2018-08-15 PROBLEM — G89.29 CHRONIC LOW BACK PAIN WITHOUT SCIATICA: Status: RESOLVED | Noted: 2018-01-19 | Resolved: 2018-08-15

## 2018-08-15 PROCEDURE — 99999 PR PBB SHADOW E&M-EST. PATIENT-LVL V: CPT | Mod: PBBFAC,,, | Performed by: INTERNAL MEDICINE

## 2018-08-15 PROCEDURE — 99385 PREV VISIT NEW AGE 18-39: CPT | Mod: S$GLB,,, | Performed by: INTERNAL MEDICINE

## 2018-08-15 RX ORDER — LANOLIN ALCOHOL/MO/W.PET/CERES
CREAM (GRAM) TOPICAL
COMMUNITY

## 2018-08-15 RX ORDER — AMOXICILLIN AND CLAVULANATE POTASSIUM 875; 125 MG/1; MG/1
TABLET, FILM COATED ORAL
COMMUNITY

## 2018-08-15 RX ORDER — DEXTROAMPHETAMINE SACCHARATE, AMPHETAMINE ASPARTATE MONOHYDRATE, DEXTROAMPHETAMINE SULFATE AND AMPHETAMINE SULFATE 7.5; 7.5; 7.5; 7.5 MG/1; MG/1; MG/1; MG/1
30 CAPSULE, EXTENDED RELEASE ORAL DAILY
Refills: 0 | COMMUNITY
Start: 2018-07-23

## 2018-08-15 RX ORDER — DEXTROAMPHETAMINE SACCHARATE, AMPHETAMINE ASPARTATE, DEXTROAMPHETAMINE SULFATE AND AMPHETAMINE SULFATE 1.25; 1.25; 1.25; 1.25 MG/1; MG/1; MG/1; MG/1
5 TABLET ORAL DAILY PRN
COMMUNITY

## 2018-08-16 ENCOUNTER — LAB VISIT (OUTPATIENT)
Dept: LAB | Facility: HOSPITAL | Age: 23
End: 2018-08-16
Attending: INTERNAL MEDICINE
Payer: COMMERCIAL

## 2018-08-16 DIAGNOSIS — E53.8 B12 DEFICIENCY: ICD-10-CM

## 2018-08-16 DIAGNOSIS — E03.9 PRIMARY HYPOTHYROIDISM: ICD-10-CM

## 2018-08-16 DIAGNOSIS — R14.0 ABDOMINAL BLOATING: ICD-10-CM

## 2018-08-16 DIAGNOSIS — R53.83 FATIGUE, UNSPECIFIED TYPE: ICD-10-CM

## 2018-08-16 DIAGNOSIS — Z00.00 ANNUAL PHYSICAL EXAM: ICD-10-CM

## 2018-08-16 LAB
ALBUMIN SERPL BCP-MCNC: 4 G/DL
ALP SERPL-CCNC: 54 U/L
ALT SERPL W/O P-5'-P-CCNC: 16 U/L
ANION GAP SERPL CALC-SCNC: 10 MMOL/L
AST SERPL-CCNC: 14 U/L
BASOPHILS # BLD AUTO: 0.03 K/UL
BASOPHILS NFR BLD: 0.5 %
BILIRUB SERPL-MCNC: 0.3 MG/DL
BUN SERPL-MCNC: 8 MG/DL
CALCIUM SERPL-MCNC: 9.3 MG/DL
CHLORIDE SERPL-SCNC: 105 MMOL/L
CHOLEST SERPL-MCNC: 214 MG/DL
CHOLEST/HDLC SERPL: 4.6 {RATIO}
CO2 SERPL-SCNC: 25 MMOL/L
CORTIS SERPL-MCNC: 12.9 UG/DL
CREAT SERPL-MCNC: 0.8 MG/DL
DIFFERENTIAL METHOD: ABNORMAL
EOSINOPHIL # BLD AUTO: 0.2 K/UL
EOSINOPHIL NFR BLD: 2.7 %
ERYTHROCYTE [DISTWIDTH] IN BLOOD BY AUTOMATED COUNT: 11.7 %
EST. GFR  (AFRICAN AMERICAN): >60 ML/MIN/1.73 M^2
EST. GFR  (NON AFRICAN AMERICAN): >60 ML/MIN/1.73 M^2
GLUCOSE SERPL-MCNC: 85 MG/DL
HCT VFR BLD AUTO: 35.7 %
HDLC SERPL-MCNC: 47 MG/DL
HDLC SERPL: 22 %
HGB BLD-MCNC: 11.9 G/DL
IGA SERPL-MCNC: 177 MG/DL
LDLC SERPL CALC-MCNC: 138.8 MG/DL
LYMPHOCYTES # BLD AUTO: 2.4 K/UL
LYMPHOCYTES NFR BLD: 44 %
MCH RBC QN AUTO: 31.3 PG
MCHC RBC AUTO-ENTMCNC: 33.3 G/DL
MCV RBC AUTO: 94 FL
MONOCYTES # BLD AUTO: 0.4 K/UL
MONOCYTES NFR BLD: 7.5 %
NEUTROPHILS # BLD AUTO: 2.5 K/UL
NEUTROPHILS NFR BLD: 45.1 %
NONHDLC SERPL-MCNC: 167 MG/DL
PLATELET # BLD AUTO: 317 K/UL
PMV BLD AUTO: 9 FL
POTASSIUM SERPL-SCNC: 3.9 MMOL/L
PROT SERPL-MCNC: 7.4 G/DL
RBC # BLD AUTO: 3.8 M/UL
SODIUM SERPL-SCNC: 140 MMOL/L
T4 FREE SERPL-MCNC: 1.44 NG/DL
THYROPEROXIDASE IGG SERPL-ACNC: 14.5 IU/ML
TRIGL SERPL-MCNC: 141 MG/DL
TSH SERPL DL<=0.005 MIU/L-ACNC: 0.87 UIU/ML
VIT B12 SERPL-MCNC: 1166 PG/ML
WBC # BLD AUTO: 5.48 K/UL

## 2018-08-16 PROCEDURE — 82784 ASSAY IGA/IGD/IGG/IGM EACH: CPT

## 2018-08-16 PROCEDURE — 80053 COMPREHEN METABOLIC PANEL: CPT

## 2018-08-16 PROCEDURE — 82533 TOTAL CORTISOL: CPT

## 2018-08-16 PROCEDURE — 85025 COMPLETE CBC W/AUTO DIFF WBC: CPT

## 2018-08-16 PROCEDURE — 86376 MICROSOMAL ANTIBODY EACH: CPT

## 2018-08-16 PROCEDURE — 82607 VITAMIN B-12: CPT | Mod: 91

## 2018-08-16 PROCEDURE — 84439 ASSAY OF FREE THYROXINE: CPT

## 2018-08-16 PROCEDURE — 82607 VITAMIN B-12: CPT

## 2018-08-16 PROCEDURE — 83516 IMMUNOASSAY NONANTIBODY: CPT

## 2018-08-16 PROCEDURE — 80061 LIPID PANEL: CPT

## 2018-08-16 PROCEDURE — 84443 ASSAY THYROID STIM HORMONE: CPT

## 2018-08-16 PROCEDURE — 82024 ASSAY OF ACTH: CPT

## 2018-08-17 PROBLEM — Z98.890 STATUS POST LEFT KNEE SURGERY: Status: ACTIVE | Noted: 2018-08-17

## 2018-08-17 PROBLEM — Z98.890 HISTORY OF LUMBOSACRAL SPINE SURGERY: Status: ACTIVE | Noted: 2018-08-17

## 2018-08-17 LAB
ACTH PLAS-MCNC: 16 PG/ML
VIT B12 SERPL-MCNC: 1032 NG/L

## 2018-08-17 NOTE — PROGRESS NOTES
Subjective:       Patient ID: Eliza Ibarra is a 22 y.o. female.    Chief Complaint: Annual Exam (new patient physical, symptoms since May ); Headache; and Fatigue   This pleasant woman is a [employer] senior, graduating with a degree in neuro sciences in May of 2019.  She is from [place] and has physicians there who are prescribing medication for her.  She has ADHD and benefits from stimulant therapy with no significant side effects, Adderall 30 mg XR 24 hr capsule.  She was also found to have a thyroid deficiency and B12 deficiency and is on levothyroxine and oral B12 supplementation.    She made this appointment because over the summer, she has had less stress, more time to relax and unfortunately she has been feeling extremely fatigued and experiencing a bitemporal and frontal headache every day.  Some mornings she awakens with a headache.  Most days the headache begins and gradually builds.  She has no history of migraines and her headache is not associated with photophobia, phonophobia, nausea or vomiting.  She has no aura to indicate headache is about to begin.  At 1st, she thought perhaps she was not well hydrated but increasing her hydration did not decrease the headache or fatigue.    Additionally, she has been experiencing abdominal bloating and upset stomach.  She is distressed by the symptoms and would like to get to the bottom of it.  Sometimes she has diarrhea, sometimes she is constipated.  Additionally, her joints are achy and sometimes feels swollen.  She tried using lactase tablets prior to dairy ingestion without benefit.  She wonders if she could be gluten intolerant.  She has not been able to identify any particular food that definitely brings on fatigue and headache.    She was born as a full-term infant.  She was adopted.  She has had 2 eye surgeries to correct strabismus.  Her right pupil is dilated to a greater extent than her left pupil.  She does not  experience any double vision.  When she becomes very tired sometimes she has trouble with her vision but she does not think that her vision or her eye movements have anything to do with her fatigue or headache and she is not willing to have an eye exam here because she has an ophthalmologist in [place].    Her doctor in [place] had recently recommended that she have blood test to check her B12 and TSH levels.    At the age of 16 she had heavy painful menstrual periods and she began on the birth control pill.  She has been on the same pill for the past 6 years without any side effects and she has daily compliance.  She takes the active pill for 21 days of each cycle, remains off 7 days and then starts a new pill pack.  She has a period every month that is normal, not too heavy or too light.  She is in a stable heterosexual monogamous relationship and wishes to continue on this pill.  She does not need a prescription.  HPI  Review of Systems   Constitutional: Positive for fatigue. Negative for activity change and unexpected weight change.   HENT: Negative for hearing loss, rhinorrhea and trouble swallowing.    Eyes: Negative for discharge and visual disturbance.   Respiratory: Negative for chest tightness and wheezing.    Cardiovascular: Negative for chest pain and palpitations.   Gastrointestinal: Positive for constipation and diarrhea. Negative for blood in stool and vomiting.   Endocrine: Negative for polydipsia and polyuria.   Genitourinary: Negative for difficulty urinating, dysuria, hematuria and menstrual problem.   Musculoskeletal: Positive for arthralgias and joint swelling. Negative for neck pain.   Neurological: Positive for headaches. Negative for weakness.   Psychiatric/Behavioral: Negative for confusion and dysphoric mood.       Objective:      Physical Exam   Constitutional: She is oriented to person, place, and time. She appears well-developed and well-nourished. No distress.   She appears  healthy.   HENT:   Head: Normocephalic and atraumatic.   Right Ear: External ear normal.   Left Ear: External ear normal.   Nose: Nose normal.   Mouth/Throat: Oropharynx is clear and moist. No oropharyngeal exudate.   Eyes: Conjunctivae are normal. No scleral icterus.   Subtle eye movement asymmetry and right pupil is slightly more dilated than the left   Neck: Normal range of motion. Neck supple. No JVD present. No tracheal deviation present. No thyromegaly present.   Cardiovascular: Normal rate, regular rhythm and normal heart sounds.   Pulmonary/Chest: Effort normal and breath sounds normal. No respiratory distress.   Abdominal: Soft. Bowel sounds are normal. She exhibits no mass. There is no tenderness. There is no rebound and no guarding.   She complained of feeling slightly bloated, but the abdomen is soft.   Musculoskeletal: She exhibits no edema.   There is a surgical scar present from prior left knee surgery, 2012 which resembles the scar often associated with a total knee replacement but what she provides the history that she had removal of the cartilage that was frayed on the back of her patella and a procedure to improve the tracking of her patella.  There is an impressive scar overriding the spinous processes of the lumbar spine that is well healed, from 2012, where the spinal fluid leak was patched.   Lymphadenopathy:     She has no cervical adenopathy.   Neurological: She is alert and oriented to person, place, and time. She displays normal reflexes. She exhibits normal muscle tone. Coordination normal.   Skin: Skin is warm and dry. No rash noted.   Psychiatric: She has a normal mood and affect. Her behavior is normal. Judgment and thought content normal.   Vitals reviewed.      Assessment:       1. Annual physical exam    2. Establishing care with new doctor, encounter for    3. Attention deficit hyperactivity disorder (ADHD), combined type    4. Chronic tension-type headache, not intractable    5.  Fatigue, unspecified type    6. B12 deficiency    7. Primary hypothyroidism    8. Abdominal bloating    9. On oral contraceptive pills for non-contraception indication    10. Status post left knee surgery    11. 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        Plan:   Eliza was seen today for annual exam, headache and fatigue.    Diagnoses and all orders for this visit:    Annual physical exam  -     CBC auto differential; Future  -     Comprehensive metabolic panel; Future  -     Vitamin B12; Future  -     TSH; Future  -     Thyroid peroxidase antibody; Future  -     T4, free; Future  -     Tissue transglutaminase, IgA; Future  -     IgA; Future  -     Lipid panel; Future  -     Vitamin B12 Deficiency Panel; Future  -     Cortisol; Future  -     ACTH; Future  -     Ambulatory consult to Allergy    Establishing care with new doctor, encounter for    Attention deficit hyperactivity disorder (ADHD), combined type, very well compensated and appropriately treated by her physician in [place].    Chronic tension-type headache, not intractable.  I cannot give her a definite diagnosis regarding her headache.  Her headache has features that suggest tension as the underlying cause.  Sometimes lifestyle and internal or external stresses play a role in the evolution of headaches that developed into daily headaches.  Therefore, I encouraged her to be regular with all of her biological function; such as when she goes to bed at night, when she wakes up in the morning, when she has her meals and maybe that would help?    She reports that this headache is nothing like the headache she experience following spinal anesthesia in 2012.  -    Fatigue, unspecified type.  It is not clear to me if her symptoms could be possibly related to a food allergy.  I encouraged her to consider an allergy consultation.  It would be simply wonderful if her problem could be related to a food  which could be eliminated easily.  -     Tissue transglutaminase, IgA; Future  -     IgA; Future  -     Vitamin B12 Deficiency Panel; Future  -     Cortisol; Future  -     ACTH; Future  -     Ambulatory consult to Allergy    B12 deficiency.  She did not know what her vitamin B12 level was or if she had been found to have autoimmune pernicious anemia.  -     Vitamin B12; Future  -     Vitamin B12 Deficiency Panel; Future  -     Ambulatory consult to Allergy    Primary hypothyroidism, she did not actually know what the exact diagnosis was so I will check a thyroid peroxidase antibody level.  -     TSH; Future  -     Thyroid peroxidase antibody; Future  -     T4, free; Future    Abdominal bloating.  I think it might be reasonable to try a completely gluten free diet for 2 weeks, just as a diagnostic therapeutic trial to see if her symptoms are ameliorated or eliminated.  -     Tissue transglutaminase, IgA; Future  -     IgA; Future  -     Ambulatory consult to Allergy    On oral contraceptive pills for non-contraception indication, no contraindication happy with her current product.    Status post left knee surgery, I failed to ask her if she was satisfied with her functional result, observing her she appears to have full range of motion and to be fit.    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      I will be in touch with her through the patient portal with her test results and encouraged her to contact me if she has questions or problems.

## 2018-08-19 ENCOUNTER — PATIENT MESSAGE (OUTPATIENT)
Dept: INTERNAL MEDICINE | Facility: CLINIC | Age: 23
End: 2018-08-19

## 2018-08-20 LAB — TTG IGA SER IA-ACNC: 2 UNITS

## 2018-08-20 NOTE — TELEPHONE ENCOUNTER
Hi Dr. Vega,   I saw that all of my test results appear to be normal which is great and I am happy to see that my thyroid has stayed within normal range. One question I do have though is which test was for celiac because I thought that there were supposed to be a few separate tests to check for that, but I wasn't sure which those were or what the results were. I will try a dairy free and gluten free diet regardless but I just wanted to clarify which tests were supposed to be specifically for celiac and if they came back negative or just non conclusive.   Thank you,   Eliza           Please advise  Thank you